# Patient Record
Sex: FEMALE | Race: BLACK OR AFRICAN AMERICAN | NOT HISPANIC OR LATINO | URBAN - NONMETROPOLITAN AREA
[De-identification: names, ages, dates, MRNs, and addresses within clinical notes are randomized per-mention and may not be internally consistent; named-entity substitution may affect disease eponyms.]

---

## 2017-09-11 PROBLEM — H25.813: Noted: 2019-10-13

## 2017-09-11 PROBLEM — H40.1132: Noted: 2019-10-13

## 2017-09-11 PROBLEM — E11.9: Noted: 2017-09-11

## 2019-10-10 ENCOUNTER — IMPORTED ENCOUNTER (OUTPATIENT)
Dept: URBAN - NONMETROPOLITAN AREA CLINIC 1 | Facility: CLINIC | Age: 49
End: 2019-10-10

## 2019-10-10 PROCEDURE — 92133 CPTRZD OPH DX IMG PST SGM ON: CPT

## 2019-10-10 NOTE — PATIENT DISCUSSION
Testing OnlyOCT ONHOD: 87 8/10 SS mild superior thinning all other quadrants normalOS: 95 7/10 SS normal RNFL; Dr's Notes:  dx by Dr. Holliday [facility] in [address] [place] [address]- 8/13/2018[facility] 18- 10/10/2019

## 2019-10-14 ENCOUNTER — IMPORTED ENCOUNTER (OUTPATIENT)
Dept: URBAN - NONMETROPOLITAN AREA CLINIC 1 | Facility: CLINIC | Age: 49
End: 2019-10-14

## 2019-10-14 PROCEDURE — S0621 ROUTINE OPHTHALMOLOGICAL EXA: HCPCS

## 2019-10-14 NOTE — PATIENT DISCUSSION
Compound Hyperopic Astigmatism OU w/Presbyopia-  discussed findings w/patient-  new spectacle Rx issued-  continue to monitor yearly or prn; Dr's Notes: GL dx by Dr. Padmini De La Cruz in [address] [place] [address]- 8/13/2018[facility]

## 2019-11-20 PROBLEM — E11.9: Noted: 2019-11-20

## 2019-11-20 PROBLEM — H52.03: Noted: 2019-11-20

## 2019-11-20 PROBLEM — H52.223: Noted: 2019-11-20

## 2019-11-20 PROBLEM — H40.1132: Noted: 2019-11-20

## 2019-11-20 PROBLEM — H25.813: Noted: 2019-11-20

## 2019-11-20 PROBLEM — H52.4: Noted: 2019-11-20

## 2020-01-27 ENCOUNTER — IMPORTED ENCOUNTER (OUTPATIENT)
Dept: URBAN - NONMETROPOLITAN AREA CLINIC 1 | Facility: CLINIC | Age: 50
End: 2020-01-27

## 2020-01-27 PROBLEM — H25.813: Noted: 2020-01-27

## 2020-01-27 PROBLEM — H52.03: Noted: 2020-01-27

## 2020-01-27 PROBLEM — H40.1132: Noted: 2020-01-27

## 2020-01-27 PROBLEM — H52.4: Noted: 2020-01-27

## 2020-01-27 PROBLEM — E11.9: Noted: 2020-01-27

## 2020-01-27 PROBLEM — H52.223: Noted: 2020-01-27

## 2020-01-27 PROCEDURE — 76514 ECHO EXAM OF EYE THICKNESS: CPT

## 2020-01-27 PROCEDURE — 99213 OFFICE O/P EST LOW 20 MIN: CPT

## 2020-01-27 NOTE — PATIENT DISCUSSION
POAG OU-  discussed findings w/patient-  IOPs today are better at 16 OU-  c/d's are 0.4 OU -  Pach done today.   OD: 612 OS: 596-  OCT ON done 10/10/2019-  24-2 VF done 8/13/2018-  patient reports good compliance w/medication-  continue Latanoprost QHS OU refills sent today-  RTC 3 mo f/u w/Gonio; Dr's Notes: GL dx by Dr. Romayne Chen in [address] [place] [address] Road- 8/13/2018ON Steph Babb- 10/10/2019

## 2020-06-22 ENCOUNTER — IMPORTED ENCOUNTER (OUTPATIENT)
Dept: URBAN - NONMETROPOLITAN AREA CLINIC 1 | Facility: CLINIC | Age: 50
End: 2020-06-22

## 2020-06-22 PROBLEM — H52.4: Noted: 2020-06-22

## 2020-06-22 PROBLEM — H25.813: Noted: 2020-01-27

## 2020-06-22 PROBLEM — H52.223: Noted: 2020-06-22

## 2020-06-22 PROBLEM — E11.3293: Noted: 2020-06-22

## 2020-06-22 PROBLEM — H52.03: Noted: 2020-06-22

## 2020-06-22 PROBLEM — H40.1132: Noted: 2020-01-27

## 2020-06-22 PROBLEM — H35.033: Noted: 2020-06-22

## 2020-06-22 PROCEDURE — 92020 GONIOSCOPY: CPT

## 2020-06-22 PROCEDURE — 99213 OFFICE O/P EST LOW 20 MIN: CPT

## 2020-06-22 NOTE — PATIENT DISCUSSION
POAG OU-  discussed findings w/patient-  IOPs today are better at 16 OU-  c/d's are 0.4 OU -  Gonio done 6/22/2020 grade 4 w/heavy pigment OU-  Pach done 1/27/2020  OD: 612 OS: 596-  OCT ON done 10/10/2019-  24-2 VF done 8/13/2018-  patient reports good compliance w/medication-  continue Latanoprost QHS OU-  RTC as scheduled or prnAmaurosis Fugax OD-  discussed findings w/patient-  educated that it is important to be seen when/if she has another event -  patient is going to see her MD on Thursday of this week--sending a note with patient to have Sed Rate CRP and Carotid Ultrasound-  continue to monitor as per her MD or PRN if she has another episodeType 2 DM w/mild NPDR OU-  discussed findings w/patient-  patient is on medication-  mild NPDR noted OU-  edcuated patient on the importance of good blood sugar control and the negative effects of poorly controlled saugars on ocular health -  monitor as scheduled or prn Hypertensive Retinopathy OD>OS-  discussed findings w/patient-  patient states her BP has been elevated-  will monitor 1 month w/DFE; Dr's Notes: GL dx by Dr. Darwin Dc in [place] 87 6/22/202024-2 VF 8/13/2018OCT ON  10/10/2019

## 2020-07-27 ENCOUNTER — IMPORTED ENCOUNTER (OUTPATIENT)
Dept: URBAN - NONMETROPOLITAN AREA CLINIC 1 | Facility: CLINIC | Age: 50
End: 2020-07-27

## 2020-07-27 PROBLEM — H40.1132: Noted: 2020-07-27

## 2020-07-27 PROBLEM — H52.03: Noted: 2020-07-27

## 2020-07-27 PROBLEM — H25.813: Noted: 2020-07-27

## 2020-07-27 PROBLEM — E11.3293: Noted: 2020-07-27

## 2020-07-27 PROBLEM — G45.3: Noted: 2020-07-27

## 2020-07-27 PROBLEM — H35.033: Noted: 2020-07-27

## 2020-07-27 PROBLEM — H52.223: Noted: 2020-07-27

## 2020-07-27 PROCEDURE — 99213 OFFICE O/P EST LOW 20 MIN: CPT

## 2020-07-27 NOTE — PATIENT DISCUSSION
Amaurosis Fugax OD-  discussed findings w/patient-  patient did have carotid ultrasound done since she was here and she does have blockages (not sure if it is one side or both sides)-  patient was told that the report had been sent here to me for review but it is not in the patient's record today-  patient is being referred to vascular surgeon but does not have any information on that today-  continue to monitor as scheduled or prnHypertensive Retinopathy-  discussed findings w/patient today-  patient says that there has been no improvement since she was here last-  she is starting to get headaches that she feels are related to BP-  encouraged patient to continue working with her PCP for control of BP-  mild dot/blot hemes noted today-  continue to monitor at 1 mo or prnPOAG OU-  discussed findings w/patient-  IOPs are 19 OU has gone back up slightly-  c/d's are 0.4 OU -  Gonio done 6/22/2020 grade 4 w/heavy pigment OU-  Pach done 1/27/2020  OD: 612 OS: 596-  OCT ON done 10/10/2019-  24-2 VF done 8/13/2018-  patient reports good compliance w/medication-  continue Latanoprost QHS OU-  RTC as scheduled or prnType 2 DM w/mild NPDR OU-  discussed findings w/patient-  patient is on medication-  mild NPDR noted OU-  edcuated patient on the importance of good blood sugar control and the negative effects of poorly controlled saugars on ocular health -  monitor as scheduled or prn; Dr's Notes: GL dx by Dr. Lala Lee in [address] 7/27/2020Gonio 6/22/202024-2 VF 8/13/2018OCT ON  10/10/2019

## 2020-08-24 ENCOUNTER — IMPORTED ENCOUNTER (OUTPATIENT)
Dept: URBAN - NONMETROPOLITAN AREA CLINIC 1 | Facility: CLINIC | Age: 50
End: 2020-08-24

## 2020-08-24 PROBLEM — H25.813: Noted: 2020-07-27

## 2020-08-24 PROBLEM — G45.3: Noted: 2020-07-27

## 2020-08-24 PROBLEM — H40.1132: Noted: 2020-08-24

## 2020-08-24 PROBLEM — E11.9: Noted: 2020-08-24

## 2020-08-24 PROCEDURE — 99214 OFFICE O/P EST MOD 30 MIN: CPT

## 2020-08-24 NOTE — PATIENT DISCUSSION
Amaurosis Fugax OU-  discussed findings w/patient-RECENTLY HAD TEMP BIOPSY-MONITOR FOR CHANGE-STRESS GOOD BS AND HTN CONTROLPOAG OU-  discussed findings w/patient-  IOPs STABLE TODAY-  c/d's are 0.4 OU -  continue Latanoprost QHS OU-  MONITOR FOR IOP AND VF CHANGESDM s DR-Stressed the importance of keeping blood sugars under control blood pressure under control and weight normalization and regular visits with PCP. -Explained the possible effects of poorly controlled diabetes and the damage that diabetes can cause to ocular health. -Patient to check HgbA1C.-Pt instructed to contact our office with any vision changes. Cataract OU-Not yet surgical. -Reviewed symptoms of advancing cataract growth such as glare and halos and decreased vision.-Continue to monitor for now.  Pt will notify us if any new symptoms develop.; Dr's Notes: PCP Claudia Sorto

## 2021-03-08 ENCOUNTER — IMPORTED ENCOUNTER (OUTPATIENT)
Dept: URBAN - NONMETROPOLITAN AREA CLINIC 1 | Facility: CLINIC | Age: 51
End: 2021-03-08

## 2021-03-08 PROCEDURE — 92014 COMPRE OPH EXAM EST PT 1/>: CPT

## 2021-03-08 NOTE — PATIENT DISCUSSION
POAG OU-  discussed findings w/patient-  increased IOPs TODAY-  c/d's are 0.4 OU -  continue Latanoprost QHS OU-start combigan bid ou-  MONITOR FOR IOP AND VF CHANGESDM s DR-Stressed the importance of keeping blood sugars under control blood pressure under control and weight normalization and regular visits with PCP. -Explained the possible effects of poorly controlled diabetes and the damage that diabetes can cause to ocular health. -Patient to check HgbA1C.-Pt instructed to contact our office with any vision changes. Cataract OU-Not yet surgical. -Reviewed symptoms of advancing cataract growth such as glare and halos and decreased vision.-Continue to monitor for now.  Pt will notify us if any new symptoms develop.; Dr's Notes: PCP Michelle Barnett

## 2021-03-22 ENCOUNTER — IMPORTED ENCOUNTER (OUTPATIENT)
Dept: URBAN - NONMETROPOLITAN AREA CLINIC 1 | Facility: CLINIC | Age: 51
End: 2021-03-22

## 2021-03-22 PROCEDURE — 92012 INTRM OPH EXAM EST PATIENT: CPT

## 2021-03-22 NOTE — PATIENT DISCUSSION
POAG OU-  discussed findings w/patient-  stable iop today-  c/d's are 0.4 OU -  continue Latanoprost QHS OU- cont combigan bid ou-  MONITOR FOR IOP AND VF CHANGESDM s DR-Stressed the importance of keeping blood sugars under control blood pressure under control and weight normalization and regular visits with PCP. -Explained the possible effects of poorly controlled diabetes and the damage that diabetes can cause to ocular health. -Patient to check HgbA1C.-Pt instructed to contact our office with any vision changes. Cataract OU-Not yet surgical. -Reviewed symptoms of advancing cataract growth such as glare and halos and decreased vision.-Continue to monitor for now.  Pt will notify us if any new symptoms develop.; Dr's Notes: PCP Shai Harris

## 2021-07-13 ENCOUNTER — IMPORTED ENCOUNTER (OUTPATIENT)
Dept: URBAN - NONMETROPOLITAN AREA CLINIC 1 | Facility: CLINIC | Age: 51
End: 2021-07-13

## 2021-07-13 PROCEDURE — 99213 OFFICE O/P EST LOW 20 MIN: CPT

## 2021-07-13 PROCEDURE — 92133 CPTRZD OPH DX IMG PST SGM ON: CPT

## 2021-07-13 NOTE — PATIENT DISCUSSION
POAG OU-  discussed findings w/patient-  stable iop today-  c/d's are 0.4 OU -  continue Latanoprost QHS OU- cont combigan bid ou-oct today/stable ou-  MONITOR FOR IOP AND VF CHANGESDM s -Stressed the importance of keeping blood sugars under control blood pressure under control and weight normalization and regular visits with PCP. -Explained the possible effects of poorly controlled diabetes and the damage that diabetes can cause to ocular health. -Patient to check HgbA1C.-Pt instructed to contact our office with any vision changes. Cataract OU-Not yet surgical. -Reviewed symptoms of advancing cataract growth such as glare and halos and decreased vision.-Continue to monitor for now.  Pt will notify us if any new symptoms develop.; Dr's Notes: PCP Esteban Curran

## 2021-11-17 ENCOUNTER — PREPPED CHART (OUTPATIENT)
Dept: RURAL CLINIC 1 | Facility: CLINIC | Age: 51
End: 2021-11-17

## 2021-11-17 ENCOUNTER — IMPORTED ENCOUNTER (OUTPATIENT)
Dept: URBAN - NONMETROPOLITAN AREA CLINIC 1 | Facility: CLINIC | Age: 51
End: 2021-11-17

## 2021-11-17 PROCEDURE — 99213 OFFICE O/P EST LOW 20 MIN: CPT

## 2021-11-17 PROCEDURE — 92083 EXTENDED VISUAL FIELD XM: CPT

## 2021-11-17 NOTE — PATIENT DISCUSSION
POAG OU-  discussed findings w/patient-  stable iop today-  c/d's are 0.4 OU -  continue Latanoprost QHS OU-  cont combigan bid ou-  vf today/stable ou-  MONITOR FOR IOP AND VF CHANGESDM s -Stressed the importance of keeping blood sugars under control blood pressure under control and weight normalization and regular visits with PCP. -Explained the possible effects of poorly controlled diabetes and the damage that diabetes can cause to ocular health. -Patient to check HgbA1C.-Pt instructed to contact our office with any vision changes. Cataract OU-Not yet surgical. -Reviewed symptoms of advancing cataract growth such as glare and halos and decreased vision.-Continue to monitor for now.  Pt will notify us if any new symptoms develop.; Dr's Notes: PCP Perry Zepeda

## 2021-11-17 NOTE — PATIENT DISCUSSION
POAG OU-  discussed findings w/patient-  stable iop today-  c/d's are 0.4 OU -  continue Latanoprost QHS OU- cont combigan bid ou-oct today/stable ou-  MONITOR FOR IOP AND VF CHANGESDM s -Stressed the importance of keeping blood sugars under control blood pressure under control and weight normalization and regular visits with PCP. -Explained the possible effects of poorly controlled diabetes and the damage that diabetes can cause to ocular health. -Patient to check HgbA1C.-Pt instructed to contact our office with any vision changes. Cataract OU-Not yet surgical. -Reviewed symptoms of advancing cataract growth such as glare and halos and decreased vision.-Continue to monitor for now.  Pt will notify us if any new symptoms develop.; Dr's Notes: PCP Veronica Mandujano

## 2022-03-15 ASSESSMENT — TONOMETRY
OS_IOP_MMHG: 17
OD_IOP_MMHG: 16

## 2022-03-15 ASSESSMENT — VISUAL ACUITY
OD_CC: 20/30
OS_CC: 20/30

## 2022-04-09 ASSESSMENT — VISUAL ACUITY
OD_SC: 20/25+
OS_SC: 20/25
OD_SC: 20/30-1
OD_CC: 20/30
OD_SC: 20/30
OD_SC: 20/30
OS_SC: 20/30
OD_SC: 20/20
OS_SC: 20/25
OD_SC: 20/30
OU_SC: 20/30
OS_CC: 20/30
OU_CC: 20/30
OS_SC: 20/25
OS_SC: 20/30-1
OD_SC: 20/30-1
OS_SC: 20/20
OD_SC: 20/40
OS_SC: 20/20
OS_SC: 20/30-1
OD_SC: 20/20-2
OS_SC: 20/40

## 2022-04-09 ASSESSMENT — TONOMETRY
OD_IOP_MMHG: 17
OD_IOP_MMHG: 16
OD_IOP_MMHG: 19
OS_IOP_MMHG: 16
OD_IOP_MMHG: 18
OS_IOP_MMHG: 19
OS_IOP_MMHG: 17
OD_IOP_MMHG: 16
OS_IOP_MMHG: 20
OD_IOP_MMHG: 27
OS_IOP_MMHG: 26
OS_IOP_MMHG: 13
OS_IOP_MMHG: 18
OD_IOP_MMHG: 16
OS_IOP_MMHG: 19
OD_IOP_MMHG: 20
OD_IOP_MMHG: 13
OS_IOP_MMHG: 16

## 2022-04-09 ASSESSMENT — PACHYMETRY
OD_CT_UM: 612; ADJ: THICK
OS_CT_UM: 596; ADJ: THICK
OD_CT_UM: 612; ADJ: THICK
OS_CT_UM: 596; ADJ: THICK
OD_CT_UM: 612; ADJ: THICK
OD_CT_UM: 612; ADJ: THICK
OS_CT_UM: 596; ADJ: THICK
OD_CT_UM: 612; ADJ: THICK
OD_CT_UM: 612; ADJ: THICK

## 2022-09-12 ENCOUNTER — ESTABLISHED PATIENT (OUTPATIENT)
Dept: RURAL CLINIC 1 | Facility: CLINIC | Age: 52
End: 2022-09-12

## 2022-09-12 DIAGNOSIS — H25.13: ICD-10-CM

## 2022-09-12 DIAGNOSIS — H40.1132: ICD-10-CM

## 2022-09-12 PROCEDURE — 92133 CPTRZD OPH DX IMG PST SGM ON: CPT

## 2022-09-12 PROCEDURE — 92014 COMPRE OPH EXAM EST PT 1/>: CPT

## 2022-09-12 ASSESSMENT — VISUAL ACUITY
OD_CC: 20/25
OD_CC: 20/25
OU_CC: 20/25
OS_CC: 20/25
OS_CC: 20/25
OU_CC: 20/25

## 2022-09-12 ASSESSMENT — TONOMETRY
OD_IOP_MMHG: 18
OS_IOP_MMHG: 17

## 2024-11-18 ENCOUNTER — COMPREHENSIVE EXAM (OUTPATIENT)
Dept: RURAL CLINIC 1 | Facility: CLINIC | Age: 54
End: 2024-11-18

## 2024-11-18 DIAGNOSIS — H25.13: ICD-10-CM

## 2024-11-18 DIAGNOSIS — H40.1132: ICD-10-CM

## 2024-11-18 DIAGNOSIS — E11.9: ICD-10-CM

## 2024-11-18 PROCEDURE — 92014 COMPRE OPH EXAM EST PT 1/>: CPT

## 2025-03-31 ENCOUNTER — FOLLOW UP (OUTPATIENT)
Age: 55
End: 2025-03-31

## 2025-03-31 DIAGNOSIS — H40.1132: ICD-10-CM

## 2025-03-31 DIAGNOSIS — E11.9: ICD-10-CM

## 2025-03-31 DIAGNOSIS — H25.13: ICD-10-CM

## 2025-03-31 PROCEDURE — 92083 EXTENDED VISUAL FIELD XM: CPT

## 2025-03-31 PROCEDURE — 99214 OFFICE O/P EST MOD 30 MIN: CPT

## 2025-07-14 ENCOUNTER — FOLLOW UP (OUTPATIENT)
Age: 55
End: 2025-07-14

## 2025-07-14 DIAGNOSIS — H40.1132: ICD-10-CM

## 2025-07-14 DIAGNOSIS — H25.13: ICD-10-CM

## 2025-07-14 DIAGNOSIS — E11.9: ICD-10-CM

## 2025-07-14 PROCEDURE — 92020 GONIOSCOPY: CPT

## 2025-07-14 PROCEDURE — 92014 COMPRE OPH EXAM EST PT 1/>: CPT
